# Patient Record
Sex: FEMALE | Race: WHITE | URBAN - METROPOLITAN AREA
[De-identification: names, ages, dates, MRNs, and addresses within clinical notes are randomized per-mention and may not be internally consistent; named-entity substitution may affect disease eponyms.]

---

## 2018-08-17 ENCOUNTER — APPOINTMENT (OUTPATIENT)
Age: 48
Setting detail: DERMATOLOGY
End: 2018-08-17

## 2018-08-17 VITALS — WEIGHT: 149.6 LBS | HEIGHT: 68 IN

## 2018-08-17 DIAGNOSIS — L40.0 PSORIASIS VULGARIS: ICD-10-CM

## 2018-08-17 PROCEDURE — OTHER COUNSELING: OTHER

## 2018-08-17 PROCEDURE — OTHER OTEZLA INITIATION: OTHER

## 2018-08-17 PROCEDURE — OTHER MIPS QUALITY: OTHER

## 2018-08-17 PROCEDURE — 99203 OFFICE O/P NEW LOW 30 MIN: CPT

## 2018-08-17 PROCEDURE — OTHER TREATMENT REGIMEN: OTHER

## 2018-08-17 PROCEDURE — OTHER FOLLOW UP FOR NEXT VISIT: OTHER

## 2018-08-17 ASSESSMENT — PGA PSORIASIS: PGA PSORIASIS: MODERATE (MODERATE PLAQUE ELEVATION, MODERATE ERYTHEMA, COARSE SCALE PREDOMINATES)

## 2018-08-17 ASSESSMENT — BSA PSORIASIS: % BODY COVERED IN PSORIASIS: 4

## 2018-08-17 NOTE — PROCEDURE: TREATMENT REGIMEN
Detail Level: Zone
Action 3: Continue
Plan: Patient has tried numerous topical, oral, and injectable medications. She did have some side effects from previous injectable medications. Patient was given an Otezla starter pack x 2  that she will start taking as directed. She will call if she has any problems once she starts the medication. Follow up in 6 to 8 weeks to check progress. We will try to get previous medical records from [facility] to review. If no improvement patient may need to follow up for a possible biopsy to confirm the diagnosis
Treatment 1: Otezla 30mg
Sig For Treatment 1 (If Needed): twice daily
Action 1: Start

## 2018-08-17 NOTE — PROCEDURE: COUNSELING
Quality 337: Tuberculosis Prevention For Psoriasis And Psoriatic Arthritis Patients On A Biological Immune Response Modifier: No documentation of negative or managed positive TB screen
Quality 410: Psoriasis Clinical Response To Oral Systemic Or Biologic Medications: Patient has been on biologic or system therapy for less than 6 months
Detail Level: Simple

## 2018-08-17 NOTE — HPI: RASH (PSORIASIS)
Do You Have A Family History Of Psoriasis?: no
How Severe Is Your Psoriasis?: moderate
Is This A New Presentation, Or A Follow-Up?: Psoriasis
Additional History: Patient here to establish care. She has been a previous patient at [facility]. She has tried numerous topical, oral, and injectable medications for her psoriasis. The last medication that she used, Taltz, did work but the patient had to stop because it was causing irritable bowel syndrome. She is here to establish care with me and is in need of treatment. Her primary area of concern are her right and left lower legs. No other skin surfaces affected. Patient is unaware if she ever had a biopsy to confirm her diagnosis. We will try to get her previous medical records from [facility]

## 2018-08-19 ENCOUNTER — RX ONLY (RX ONLY)
Age: 48
End: 2018-08-19

## 2019-01-03 ENCOUNTER — APPOINTMENT (OUTPATIENT)
Age: 49
Setting detail: DERMATOLOGY
End: 2019-01-04

## 2019-01-03 VITALS — HEIGHT: 68 IN

## 2019-01-03 DIAGNOSIS — L40.0 PSORIASIS VULGARIS: ICD-10-CM

## 2019-01-03 DIAGNOSIS — L57.8 OTHER SKIN CHANGES DUE TO CHRONIC EXPOSURE TO NONIONIZING RADIATION: ICD-10-CM

## 2019-01-03 PROCEDURE — 99213 OFFICE O/P EST LOW 20 MIN: CPT

## 2019-01-03 PROCEDURE — OTHER COUNSELING: OTHER

## 2019-01-03 PROCEDURE — OTHER MIPS QUALITY: OTHER

## 2019-01-03 PROCEDURE — OTHER OTEZLA MONITORING: OTHER

## 2019-01-03 PROCEDURE — OTHER PRESCRIPTION: OTHER

## 2019-01-03 PROCEDURE — OTHER TREATMENT REGIMEN: OTHER

## 2019-01-03 PROCEDURE — OTHER FOLLOW UP FOR NEXT VISIT: OTHER

## 2019-01-03 RX ORDER — TRETINOIN 0.5 MG/G
APPLY THIN COAT CREAM TOPICAL
Qty: 1 | Refills: 1 | Status: ERX | COMMUNITY
Start: 2019-01-03

## 2019-01-03 ASSESSMENT — LOCATION SIMPLE DESCRIPTION DERM
LOCATION SIMPLE: LEFT PRETIBIAL REGION
LOCATION SIMPLE: RIGHT PRETIBIAL REGION
LOCATION SIMPLE: LEFT CHEEK
LOCATION SIMPLE: RIGHT CHEEK

## 2019-01-03 ASSESSMENT — LOCATION DETAILED DESCRIPTION DERM
LOCATION DETAILED: RIGHT CENTRAL MALAR CHEEK
LOCATION DETAILED: RIGHT PROXIMAL PRETIBIAL REGION
LOCATION DETAILED: LEFT SUPERIOR MEDIAL BUCCAL CHEEK
LOCATION DETAILED: RIGHT SUPERIOR MEDIAL BUCCAL CHEEK
LOCATION DETAILED: LEFT MEDIAL MALAR CHEEK
LOCATION DETAILED: LEFT PROXIMAL PRETIBIAL REGION

## 2019-01-03 ASSESSMENT — BSA PSORIASIS: % BODY COVERED IN PSORIASIS: 0

## 2019-01-03 ASSESSMENT — LOCATION ZONE DERM
LOCATION ZONE: FACE
LOCATION ZONE: LEG

## 2019-01-03 ASSESSMENT — PGA PSORIASIS: PGA PSORIASIS: CLEAR (NO ELEVATION, SCALE, OR ERYTHEMA, EXCEPT FOR RESIDUAL DISCOLORATION)

## 2019-01-03 NOTE — PROCEDURE: COUNSELING
Detail Level: Simple
Quality 410: Psoriasis Clinical Response To Oral Systemic Or Biologic Medications: Psoriasis Assessment Tool Documented, Met Specified Benchmark
Detail Level: Detailed

## 2019-01-03 NOTE — PROCEDURE: OTEZLA MONITORING
Detail Level: Zone
Length Of Therapy: 4 months
Add High Risk Medication Management Associated Diagnosis?: No

## 2019-01-03 NOTE — PROCEDURE: TREATMENT REGIMEN
Initiate Treatment: Tretinoin 0.05% cream
Action 3: Continue
Sig For Treatment 1 (If Needed): twice daily
Treatment 1: Otezla 30mg
Detail Level: Zone
Plan: Patient is doing very well, The skin is completely clear, she will continue the Otezla 30 mg twice daily. She is receiving her prescription from [facility] and she will call us if a new prescription or refill is needed. Otherwise follow up in six months. No monitoring is specifically required on this medication
Plan: We will try to send in the generic prescription to her local pharmacy but if there is a cost issue she can call us back and we can send in the branded product to the mail order pharmacy. Follow up in six months. Patient is not trying to get pregnant and is aware of the possible photo sensitivity of the medication. She would benefit from a full body skin exam at her follow up visit and I will discuss this with her at follow up
Detail Level: Simple

## 2019-01-03 NOTE — PROCEDURE: FOLLOW UP FOR NEXT VISIT
Scheduled For Follow Up In (Optional): PRN
Detail Level: Generalized
Scheduled For Follow Up In (Optional): 6 months
Detail Level: Detailed

## 2019-09-27 ENCOUNTER — APPOINTMENT (OUTPATIENT)
Age: 49
Setting detail: DERMATOLOGY
End: 2019-09-28

## 2019-09-27 VITALS — HEIGHT: 66 IN

## 2019-09-27 DIAGNOSIS — L40.0 PSORIASIS VULGARIS: ICD-10-CM

## 2019-09-27 PROCEDURE — OTHER FOLLOW UP FOR NEXT VISIT: OTHER

## 2019-09-27 PROCEDURE — 99214 OFFICE O/P EST MOD 30 MIN: CPT

## 2019-09-27 PROCEDURE — OTHER COUNSELING: OTHER

## 2019-09-27 PROCEDURE — OTHER MIPS QUALITY: OTHER

## 2019-09-27 PROCEDURE — OTHER TREATMENT REGIMEN: OTHER

## 2019-09-27 PROCEDURE — OTHER OTEZLA MONITORING: OTHER

## 2019-09-27 ASSESSMENT — PGA PSORIASIS: PGA PSORIASIS: MODERATE (MODERATE PLAQUE ELEVATION, MODERATE ERYTHEMA, COARSE SCALE PREDOMINATES)

## 2019-09-27 ASSESSMENT — LOCATION ZONE DERM: LOCATION ZONE: LEG

## 2019-09-27 ASSESSMENT — LOCATION DETAILED DESCRIPTION DERM
LOCATION DETAILED: RIGHT DISTAL PRETIBIAL REGION
LOCATION DETAILED: LEFT PROXIMAL PRETIBIAL REGION
LOCATION DETAILED: RIGHT PROXIMAL PRETIBIAL REGION

## 2019-09-27 ASSESSMENT — LOCATION SIMPLE DESCRIPTION DERM
LOCATION SIMPLE: RIGHT PRETIBIAL REGION
LOCATION SIMPLE: LEFT PRETIBIAL REGION

## 2019-09-27 ASSESSMENT — BSA PSORIASIS: % BODY COVERED IN PSORIASIS: 4

## 2019-09-27 NOTE — PROCEDURE: COUNSELING
Quality 337: Tuberculosis Prevention For Psoriasis And Psoriatic Arthritis Patients On A Biological Immune Response Modifier: Patient has a documented negative annual TB screening.
Quality 410: Psoriasis Clinical Response To Oral Systemic Or Biologic Medications: Psoriasis Assessment Tool Documented, Not Meeting Specified Benchmark
Detail Level: Simple

## 2019-09-27 NOTE — PROCEDURE: TREATMENT REGIMEN
Samples Given: Lenorari x2 QHS, Hector Pamphlet
Action 2: Continue
Plan: For now patient will continue Otezla 30 mg twice a day. She will start using the topical medication and if that works she will call for a prescription. If not she will consider changing from Otezla to a new injectable medication. Otherwise follow up in three months
Initiate Regimen: Duobri daily
Detail Level: Simple
Sig For Treatment 1 (If Needed): twice daily
Treatment 1: Otezla 30mg

## 2019-09-27 NOTE — PROCEDURE: OTEZLA MONITORING
Detail Level: Zone
Add High Risk Medication Management Associated Diagnosis?: No
Length Of Therapy: 8 months

## 2020-07-01 ENCOUNTER — RX ONLY (RX ONLY)
Age: 50
End: 2020-07-01

## 2020-07-01 RX ORDER — CLOBETASOL PROPIONATE 0.5 MG/G
THIN COAT CREAM TOPICAL BID
Qty: 1 | Refills: 1 | Status: ERX | COMMUNITY
Start: 2020-07-01

## 2020-08-24 ENCOUNTER — RX ONLY (RX ONLY)
Age: 50
End: 2020-08-24

## 2020-08-24 RX ORDER — APREMILAST 30 MG/1
30MG TABLET, FILM COATED ORAL BID
Qty: 60 | Refills: 1 | Status: CANCELLED

## 2020-12-29 ENCOUNTER — APPOINTMENT (OUTPATIENT)
Age: 50
Setting detail: DERMATOLOGY
End: 2020-12-30

## 2020-12-29 DIAGNOSIS — L40.0 PSORIASIS VULGARIS: ICD-10-CM

## 2020-12-29 PROCEDURE — OTHER TREATMENT REGIMEN: OTHER

## 2020-12-29 PROCEDURE — 99213 OFFICE O/P EST LOW 20 MIN: CPT | Mod: 95

## 2020-12-29 PROCEDURE — OTHER COUNSELING: OTHER

## 2020-12-29 PROCEDURE — OTHER OTEZLA MONITORING: OTHER

## 2020-12-29 PROCEDURE — OTHER FOLLOW UP FOR NEXT VISIT: OTHER

## 2020-12-29 ASSESSMENT — LOCATION DETAILED DESCRIPTION DERM
LOCATION DETAILED: LEFT PROXIMAL PRETIBIAL REGION
LOCATION DETAILED: RIGHT PROXIMAL PRETIBIAL REGION
LOCATION DETAILED: RIGHT DISTAL PRETIBIAL REGION

## 2020-12-29 ASSESSMENT — LOCATION SIMPLE DESCRIPTION DERM
LOCATION SIMPLE: LEFT PRETIBIAL REGION
LOCATION SIMPLE: RIGHT PRETIBIAL REGION

## 2020-12-29 ASSESSMENT — LOCATION ZONE DERM: LOCATION ZONE: LEG

## 2020-12-29 ASSESSMENT — PGA PSORIASIS: PGA PSORIASIS 2020: ALMOST CLEAR

## 2020-12-29 ASSESSMENT — BSA PSORIASIS: % BODY COVERED IN PSORIASIS: 2

## 2020-12-29 NOTE — PROCEDURE: COUNSELING
Detail Level: Zone
Quality 410: Psoriasis Clinical Response To Oral Systemic Or Biologic Medications: Psoriasis Assessment Tool Documented, Met Specified Benchmark

## 2020-12-29 NOTE — PROCEDURE: TREATMENT REGIMEN
Detail Level: Zone
Action 4: Continue
Plan: Overall patient is doing well. She will continue Otezla 30 mg twice a day and use a topical steroid as needed. She will follow up annually. It would be good to see her in person at some point for a comprehensive skin exam
Treatment 1: Otezla 30mg
Treatment 2: Clobetasol cream
Continue Regimen: Otezla, Clobetasol prn
Sig For Treatment 2 (If Needed): once daily
Sig For Treatment 1 (If Needed): twice daily

## 2020-12-29 NOTE — PROCEDURE: OTEZLA MONITORING
Add High Risk Medication Management Associated Diagnosis?: No
Length Of Therapy: 8 months
Detail Level: Zone

## 2021-01-27 ENCOUNTER — RX ONLY (RX ONLY)
Age: 51
End: 2021-01-27

## 2021-01-27 RX ORDER — APREMILAST 30 MG/1
30MG TABLET, FILM COATED ORAL BID
Qty: 60 | Refills: 11 | Status: ERX

## 2021-02-25 ENCOUNTER — APPOINTMENT (OUTPATIENT)
Age: 51
Setting detail: DERMATOLOGY
End: 2021-02-26

## 2021-02-25 VITALS — WEIGHT: 142 LBS | HEIGHT: 68 IN | TEMPERATURE: 97.3 F

## 2021-02-25 DIAGNOSIS — L57.8 OTHER SKIN CHANGES DUE TO CHRONIC EXPOSURE TO NONIONIZING RADIATION: ICD-10-CM

## 2021-02-25 DIAGNOSIS — L72.0 EPIDERMAL CYST: ICD-10-CM

## 2021-02-25 PROCEDURE — OTHER SUNSCREEN RECOMMENDATIONS: OTHER

## 2021-02-25 PROCEDURE — 17110 DESTRUCT B9 LESION 1-14: CPT

## 2021-02-25 PROCEDURE — OTHER PRESCRIPTION: OTHER

## 2021-02-25 PROCEDURE — OTHER TREATMENT REGIMEN: OTHER

## 2021-02-25 PROCEDURE — OTHER BENIGN DESTRUCTION: OTHER

## 2021-02-25 PROCEDURE — OTHER COUNSELING: OTHER

## 2021-02-25 PROCEDURE — OTHER MIPS QUALITY: OTHER

## 2021-02-25 RX ORDER — TRETIONIN 1 MG/G
THIN COAT CREAM TOPICAL QHS
Qty: 1 | Refills: 2 | Status: ERX | COMMUNITY
Start: 2021-02-25

## 2021-02-25 ASSESSMENT — LOCATION DETAILED DESCRIPTION DERM
LOCATION DETAILED: RIGHT LOWER CUTANEOUS LIP
LOCATION DETAILED: LEFT LATERAL CANTHUS
LOCATION DETAILED: RIGHT MEDIAL MALAR CHEEK

## 2021-02-25 ASSESSMENT — LOCATION SIMPLE DESCRIPTION DERM
LOCATION SIMPLE: RIGHT LIP
LOCATION SIMPLE: LEFT EYELID
LOCATION SIMPLE: RIGHT CHEEK

## 2021-02-25 ASSESSMENT — LOCATION ZONE DERM
LOCATION ZONE: FACE
LOCATION ZONE: LIP
LOCATION ZONE: EYELID

## 2021-02-25 NOTE — PROCEDURE: BENIGN DESTRUCTION
Bill Insurance (You Assume Risk Of Denial Or Audit By Selecting Yes): Yes
Add 52 Modifier (Optional): no
Medical Necessity Clause: This procedure was medically necessary because the lesions that were treated were: irritated and enlarging
Detail Level: Detailed
Post-Care Instructions: I reviewed with the patient in detail post-care instructions. Patient is to wear sunprotection, and avoid picking at any of the treated lesions. Pt may apply Vaseline to crusted or scabbing areas.
Consent: The patient's consent was obtained including but not limited to risks of crusting, scabbing, blistering, scarring, darker or lighter pigmentary change, recurrence, incomplete removal and infection.
Anesthesia Volume In Cc: 0.4
Medical Necessity Information: It is in your best interest to select a reason for this procedure from the list below. All of these items fulfill various CMS LCD requirements except the new and changing color options.

## 2021-02-25 NOTE — HPI: CYST (MILIA)
How Severe Is It?: mild
Is This A New Presentation, Or A Follow-Up?: Cysts
Additional History: Pt would like to discuss milia extraction as well as getting a prescription of Tretinoin. Patient essentially has a few raised spots on her face that she would like checked and treated today. None of them are very bothersome but they are occasionally irritated

## 2021-02-25 NOTE — PROCEDURE: TREATMENT REGIMEN
Plan: 1 small milia at the left lateral eye removed with # 11 blade and comedone extractor. Then she had two small benign fibrous papules that we treated with electrodesiccation and light curettage. Follow up as needed
Initiate Treatment: Tretinoin 0.1% cream
Initiate Treatment: Benign destruction
Detail Level: Zone
Detail Level: Detailed

## 2022-01-13 ENCOUNTER — APPOINTMENT (OUTPATIENT)
Age: 52
Setting detail: DERMATOLOGY
End: 2022-01-16

## 2022-01-13 DIAGNOSIS — L40.0 PSORIASIS VULGARIS: ICD-10-CM

## 2022-01-13 PROCEDURE — OTHER PRESCRIPTION: OTHER

## 2022-01-13 PROCEDURE — OTHER OTEZLA MONITORING: OTHER

## 2022-01-13 PROCEDURE — OTHER TREATMENT REGIMEN: OTHER

## 2022-01-13 PROCEDURE — OTHER FOLLOW UP FOR NEXT VISIT: OTHER

## 2022-01-13 PROCEDURE — OTHER COUNSELING: OTHER

## 2022-01-13 PROCEDURE — 99214 OFFICE O/P EST MOD 30 MIN: CPT

## 2022-01-13 PROCEDURE — OTHER ORDER TESTS: OTHER

## 2022-01-13 RX ORDER — CLOBETASOL PROPIONATE 0.5 MG/G
THIN COAT CREAM TOPICAL BID
Qty: 45 | Refills: 1 | Status: ERX | COMMUNITY
Start: 2022-01-13

## 2022-01-13 ASSESSMENT — PGA PSORIASIS: PGA PSORIASIS 2020: MODERATE

## 2022-01-13 ASSESSMENT — BSA PSORIASIS: % BODY COVERED IN PSORIASIS: 10

## 2022-01-13 ASSESSMENT — LOCATION SIMPLE DESCRIPTION DERM
LOCATION SIMPLE: LEFT PRETIBIAL REGION
LOCATION SIMPLE: RIGHT PRETIBIAL REGION

## 2022-01-13 ASSESSMENT — LOCATION DETAILED DESCRIPTION DERM
LOCATION DETAILED: RIGHT PROXIMAL PRETIBIAL REGION
LOCATION DETAILED: RIGHT DISTAL PRETIBIAL REGION
LOCATION DETAILED: LEFT PROXIMAL PRETIBIAL REGION

## 2022-01-13 ASSESSMENT — LOCATION ZONE DERM: LOCATION ZONE: LEG

## 2022-01-13 NOTE — PROCEDURE: COUNSELING
Quality 337: Tuberculosis Prevention For Psoriasis And Psoriatic Arthritis Patients On A Biological Immune Response Modifier: No documentation of negative or managed positive TB screen
Quality 410: Psoriasis Clinical Response To Oral Systemic Or Biologic Medications: Psoriasis Assessment Tool Documented, Not Meeting Specified Benchmark
Detail Level: Zone

## 2022-01-13 NOTE — PROCEDURE: TREATMENT REGIMEN
Treatment 2: Clobetasol cream
Action 2: Continue
Continue Regimen: Otezla, Clobetasol prn
Detail Level: Zone
Treatment 1: Otezla 30mg
Sig For Treatment 2 (If Needed): once daily
Plan: For now patient will continue Otezla 30 mg twice a day. We will submit paperwork for authorization for Skyrizi. Due to the fact the patient is having ongoing exacerbation even on Otezla it’s time to change the medication. She will follow up two months after starting Skyrizi. She will check her blood work prior to submitting the authorization for Skyrizi
Sig For Treatment 1 (If Needed): twice daily
Discontinue Regimen: Otezla once starting Skyrizi

## 2022-01-17 ENCOUNTER — RX ONLY (RX ONLY)
Age: 52
End: 2022-01-17

## 2022-01-17 RX ORDER — APREMILAST 30 MG/1
TABLET, FILM COATED ORAL BID
Qty: 60 | Refills: 11 | Status: CANCELLED

## 2022-03-02 ENCOUNTER — RX ONLY (RX ONLY)
Age: 52
End: 2022-03-02

## 2022-03-02 RX ORDER — BETAMETHASONE DIPROPIONATE 0.5 MG/G
THIN COAT CREAM TOPICAL BID
Qty: 45 | Refills: 1 | Status: ERX | COMMUNITY
Start: 2022-03-02

## 2022-03-16 ENCOUNTER — RX ONLY (RX ONLY)
Age: 52
End: 2022-03-16

## 2022-03-16 RX ORDER — PREDNISONE 10 MG/1
10MG TABLET ORAL QAM
Qty: 100 | Refills: 0 | Status: CANCELLED
Stop reason: CLARIF

## 2022-03-16 RX ORDER — PREDNISONE 10 MG/1
10MG TABLET ORAL QAM
Qty: 38 | Refills: 0 | Status: ERX | COMMUNITY
Start: 2022-03-16

## 2022-03-30 ENCOUNTER — RX ONLY (RX ONLY)
Age: 52
End: 2022-03-30

## 2022-03-30 RX ORDER — RISANKIZUMAB-RZAA 150 MG/ML
150MG INJECTION SUBCUTANEOUS
Qty: 1 | Refills: 6 | Status: ERX | COMMUNITY
Start: 2022-03-30

## 2023-04-05 ENCOUNTER — APPOINTMENT (OUTPATIENT)
Age: 53
Setting detail: DERMATOLOGY
End: 2023-04-05

## 2023-04-05 DIAGNOSIS — L40.0 PSORIASIS VULGARIS: ICD-10-CM

## 2023-04-05 PROCEDURE — OTHER TREATMENT REGIMEN: OTHER

## 2023-04-05 PROCEDURE — OTHER FOLLOW UP FOR NEXT VISIT: OTHER

## 2023-04-05 PROCEDURE — OTHER ORDER TESTS: OTHER

## 2023-04-05 PROCEDURE — OTHER COUNSELING: OTHER

## 2023-04-05 ASSESSMENT — LOCATION DETAILED DESCRIPTION DERM
LOCATION DETAILED: RIGHT DISTAL PRETIBIAL REGION
LOCATION DETAILED: RIGHT PROXIMAL PRETIBIAL REGION
LOCATION DETAILED: LEFT PROXIMAL PRETIBIAL REGION

## 2023-04-05 ASSESSMENT — LOCATION ZONE DERM: LOCATION ZONE: LEG

## 2023-04-05 ASSESSMENT — LOCATION SIMPLE DESCRIPTION DERM
LOCATION SIMPLE: LEFT PRETIBIAL REGION
LOCATION SIMPLE: RIGHT PRETIBIAL REGION

## 2023-04-05 NOTE — PROCEDURE: COUNSELING
Quality 410: Psoriasis Clinical Response To Oral Systemic Or Biologic Medications: Psoriasis Assessment Tool Documented, Not Meeting Specified Benchmark
Quality 337: Tuberculosis Prevention For Psoriasis And Psoriatic Arthritis Patients On A Biological Immune Response Modifier: No documentation of negative or managed positive TB screen
Detail Level: Zone

## 2023-04-05 NOTE — PROCEDURE: TREATMENT REGIMEN
Action 4: Continue
Treatment 2: Clobetasol cream
Continue Regimen: Otezla, Clobetasol prn
Treatment 1: Otezla 30mg
Detail Level: Zone
Sig For Treatment 2 (If Needed): once daily
Plan: For now patient will continue Otezla 30 mg twice a day. We will submit paperwork for authorization for Skyrizi. Due to the fact the patient is having ongoing exacerbation even on Otezla it’s time to change the medication. She will follow up two months after starting Skyrizi. She will check her blood work prior to submitting the authorization for Skyrizi
Sig For Treatment 1 (If Needed): twice daily
Discontinue Regimen: Otezla once starting Skyrizi

## 2023-04-06 ENCOUNTER — APPOINTMENT (OUTPATIENT)
Age: 53
Setting detail: DERMATOLOGY
End: 2023-04-06

## 2023-04-06 DIAGNOSIS — L40.0 PSORIASIS VULGARIS: ICD-10-CM

## 2023-04-06 PROCEDURE — OTHER COUNSELING: OTHER

## 2023-04-06 PROCEDURE — OTHER ORDER TESTS: OTHER

## 2023-04-06 PROCEDURE — OTHER TREATMENT REGIMEN: OTHER

## 2023-04-06 PROCEDURE — OTHER OTEZLA MONITORING: OTHER

## 2023-04-06 PROCEDURE — OTHER FOLLOW UP FOR NEXT VISIT: OTHER

## 2023-04-06 ASSESSMENT — LOCATION SIMPLE DESCRIPTION DERM
LOCATION SIMPLE: RIGHT PRETIBIAL REGION
LOCATION SIMPLE: LEFT PRETIBIAL REGION

## 2023-04-06 ASSESSMENT — LOCATION ZONE DERM: LOCATION ZONE: LEG

## 2023-04-06 NOTE — PROCEDURE: TREATMENT REGIMEN
Plan: For now patient will continue Otezla 30 mg twice a day. We will submit paperwork for authorization for Skyrizi. Due to the fact the patient is having ongoing exacerbation even on Otezla it’s time to change the medication. She will follow up two months after starting Skyrizi. She will check her blood work prior to submitting the authorization for Skyrizi
Detail Level: Zone
Action 4: Continue
Treatment 1: Otezla 30mg
Continue Regimen: Otezla, Clobetasol prn
Sig For Treatment 1 (If Needed): twice daily
Sig For Treatment 2 (If Needed): once daily
Treatment 2: Clobetasol cream
Discontinue Regimen: Otezla once starting Skyrizi

## 2023-04-06 NOTE — PROCEDURE: COUNSELING
Detail Level: Zone
Quality 337: Tuberculosis Prevention For Psoriasis And Psoriatic Arthritis Patients On A Biological Immune Response Modifier: No documentation of negative or managed positive TB screen
Quality 410: Psoriasis Clinical Response To Oral Systemic Or Biologic Medications: Psoriasis Assessment Tool Documented, Not Meeting Specified Benchmark

## 2023-04-11 ENCOUNTER — APPOINTMENT (OUTPATIENT)
Age: 53
Setting detail: DERMATOLOGY
End: 2023-04-11

## 2023-04-11 DIAGNOSIS — L40.0 PSORIASIS VULGARIS: ICD-10-CM

## 2023-04-11 PROCEDURE — 99213 OFFICE O/P EST LOW 20 MIN: CPT

## 2023-04-11 PROCEDURE — OTHER ORDER TESTS: OTHER

## 2023-04-11 PROCEDURE — OTHER FOLLOW UP FOR NEXT VISIT: OTHER

## 2023-04-11 PROCEDURE — OTHER TREATMENT REGIMEN: OTHER

## 2023-04-11 PROCEDURE — OTHER COUNSELING: OTHER

## 2023-04-11 ASSESSMENT — LOCATION SIMPLE DESCRIPTION DERM
LOCATION SIMPLE: RIGHT PRETIBIAL REGION
LOCATION SIMPLE: LEFT PRETIBIAL REGION

## 2023-04-11 ASSESSMENT — ITCH NUMERIC RATING SCALE: HOW SEVERE IS YOUR ITCHING?: 0

## 2023-04-11 ASSESSMENT — PGA PSORIASIS: PGA PSORIASIS 2020: CLEAR

## 2023-04-11 ASSESSMENT — BSA PSORIASIS: % BODY COVERED IN PSORIASIS: 0

## 2023-04-11 ASSESSMENT — LOCATION ZONE DERM: LOCATION ZONE: LEG

## 2023-04-11 NOTE — PROCEDURE: TREATMENT REGIMEN
Detail Level: Zone
Sig For Treatment 2 (If Needed): once daily
Treatment 2: Clobetasol cream
Treatment 1: Skyrizi 150 mg injected every 12 weeks
Sig For Treatment 1 (If Needed): twice daily
Action 2: Continue
Plan: Patient has seen complete clearance on Skyrizi. She has a reduced itch rating scale of 0. She will continue injecting every 12 weeks. We will reauthorize the medication with her insurance. Lab slip given to patient to have annual TB test. Follow up if anything changes, otherwise annually.
Samples Given: Lab slip
Continue Regimen: Skyrizi every 12 weeks

## 2023-04-18 ENCOUNTER — RX ONLY (RX ONLY)
Age: 53
End: 2023-04-18

## 2023-04-18 RX ORDER — RISANKIZUMAB-RZAA 150 MG/ML
150 MG INJECTION SUBCUTANEOUS
Qty: 1 | Refills: 5 | Status: ERX

## 2024-06-04 ENCOUNTER — APPOINTMENT (OUTPATIENT)
Age: 54
Setting detail: DERMATOLOGY
End: 2024-06-04

## 2024-06-04 DIAGNOSIS — L40.0 PSORIASIS VULGARIS: ICD-10-CM

## 2024-06-04 PROCEDURE — OTHER FOLLOW UP FOR NEXT VISIT: OTHER

## 2024-06-04 PROCEDURE — OTHER TREATMENT REGIMEN: OTHER

## 2024-06-04 PROCEDURE — OTHER COUNSELING: OTHER

## 2024-06-04 ASSESSMENT — LOCATION SIMPLE DESCRIPTION DERM
LOCATION SIMPLE: RIGHT PRETIBIAL REGION
LOCATION SIMPLE: LEFT PRETIBIAL REGION

## 2024-06-04 ASSESSMENT — LOCATION ZONE DERM: LOCATION ZONE: LEG

## 2024-06-04 NOTE — PROCEDURE: TREATMENT REGIMEN
Treatment 1: Skyrizi 150 mg injected every 12 weeks
Detail Level: Zone
Sig For Treatment 1 (If Needed): twice daily
Action 4: Continue
Plan: Patient has seen complete clearance on Skyrizi. She has a reduced itch rating scale of 0. She will continue injecting every 12 weeks. We will reauthorize the medication with her insurance. Lab slip given to patient to have annual TB test. Follow up if anything changes, otherwise annually.
Sig For Treatment 2 (If Needed): once daily
Samples Given: Lab slip
Treatment 2: Clobetasol cream
Continue Regimen: Skyrizi every 12 weeks

## 2024-09-30 ENCOUNTER — APPOINTMENT (OUTPATIENT)
Age: 54
Setting detail: DERMATOLOGY
End: 2024-10-01

## 2024-09-30 DIAGNOSIS — L40.0 PSORIASIS VULGARIS: ICD-10-CM

## 2024-09-30 PROCEDURE — OTHER TREATMENT REGIMEN: OTHER

## 2024-09-30 PROCEDURE — OTHER COUNSELING: OTHER

## 2024-09-30 PROCEDURE — 99213 OFFICE O/P EST LOW 20 MIN: CPT

## 2024-09-30 PROCEDURE — OTHER FOLLOW UP FOR NEXT VISIT: OTHER

## 2024-09-30 PROCEDURE — OTHER SOTYKTU INITIATION: OTHER

## 2024-09-30 ASSESSMENT — LOCATION SIMPLE DESCRIPTION DERM
LOCATION SIMPLE: RIGHT PRETIBIAL REGION
LOCATION SIMPLE: RIGHT CALF
LOCATION SIMPLE: LEFT PRETIBIAL REGION
LOCATION SIMPLE: LEFT CALF

## 2024-09-30 ASSESSMENT — LOCATION DETAILED DESCRIPTION DERM
LOCATION DETAILED: LEFT DISTAL CALF
LOCATION DETAILED: RIGHT DISTAL PRETIBIAL REGION
LOCATION DETAILED: LEFT PROXIMAL PRETIBIAL REGION
LOCATION DETAILED: RIGHT DISTAL CALF

## 2024-09-30 ASSESSMENT — ITCH NUMERIC RATING SCALE: HOW SEVERE IS YOUR ITCHING?: 4

## 2024-09-30 ASSESSMENT — BSA PSORIASIS: % BODY COVERED IN PSORIASIS: 10

## 2024-09-30 ASSESSMENT — PGA PSORIASIS: PGA PSORIASIS 2020: MODERATE

## 2024-09-30 ASSESSMENT — LOCATION ZONE DERM: LOCATION ZONE: LEG

## 2024-09-30 NOTE — PROCEDURE: TREATMENT REGIMEN
Detail Level: Zone
Discontinue Regimen: Skyrizi
Action 2: Continue
Samples Given: Sotyktu x 60 days
Initiate Regimen: Sotyktu
Plan: Patient has been off Skyrizi since July. She did have majority clearance with regular flare ups however due to insurance she had to stop taking it. She was interested in trying Sotyktu as a replacement. She did have blood work in the last year that she will have sent to our office. Gave patient 60 days of Sotyktu samples to try. She will follow up if she would like to continue the medication or if she does not see improvement. Patient filled out enrollment form in office today.

## 2024-09-30 NOTE — PROCEDURE: SOTYKTU INITIATION
Is Soriatane Contraindicated?: No
Pregnancy And Lactation Warning Text: There is insufficient data to evaluate whether or not Sotyktu is safe to use during pregnancy.   It is not known if Sotyktu passes into breast milk and whether or not it is safe to use when breastfeeding.  
Sotyktu Dosing: 6mg Daily
Diagnosis (Required): Psoriasis
Sotyktu Monitoring Guidelines: LFTs, lipids, hepatitis screening, TB screening and pregnancy tests should be checked prior to initiating Sotyktu therapy.  Labs may also be checked periodically after the initiation period.
Detail Level: Zone

## 2024-12-02 ENCOUNTER — APPOINTMENT (OUTPATIENT)
Age: 54
Setting detail: DERMATOLOGY
End: 2024-12-03

## 2024-12-02 DIAGNOSIS — L40.0 PSORIASIS VULGARIS: ICD-10-CM

## 2024-12-02 PROCEDURE — OTHER TREATMENT REGIMEN: OTHER

## 2024-12-02 PROCEDURE — OTHER SOTYKTU MONITORING: OTHER

## 2024-12-02 PROCEDURE — 99213 OFFICE O/P EST LOW 20 MIN: CPT

## 2024-12-02 PROCEDURE — OTHER COUNSELING: OTHER

## 2024-12-02 PROCEDURE — OTHER FOLLOW UP FOR NEXT VISIT: OTHER

## 2024-12-02 ASSESSMENT — LOCATION SIMPLE DESCRIPTION DERM
LOCATION SIMPLE: LEFT PRETIBIAL REGION
LOCATION SIMPLE: LEFT CALF
LOCATION SIMPLE: RIGHT PRETIBIAL REGION
LOCATION SIMPLE: RIGHT CALF

## 2024-12-02 ASSESSMENT — ITCH NUMERIC RATING SCALE: HOW SEVERE IS YOUR ITCHING?: 0

## 2024-12-02 ASSESSMENT — LOCATION DETAILED DESCRIPTION DERM
LOCATION DETAILED: LEFT DISTAL CALF
LOCATION DETAILED: LEFT PROXIMAL PRETIBIAL REGION
LOCATION DETAILED: RIGHT DISTAL CALF
LOCATION DETAILED: RIGHT DISTAL PRETIBIAL REGION

## 2024-12-02 ASSESSMENT — BSA PSORIASIS: % BODY COVERED IN PSORIASIS: 3

## 2024-12-02 ASSESSMENT — LOCATION ZONE DERM: LOCATION ZONE: LEG

## 2024-12-02 ASSESSMENT — PGA PSORIASIS: PGA PSORIASIS 2020: ALMOST CLEAR

## 2024-12-02 NOTE — PROCEDURE: TREATMENT REGIMEN
Detail Level: Zone
Continue Regimen: Sotyktu
Action 2: Continue
Samples Given: VTAMA X 3
Plan: Patient will continue the Sotyktu. One more month of samples were given. We will send in her paperwork to get this approved through her insurance. Follow up in three months to check progress. She will start the sample samples of the VTAMA once daily and if this is working for the residual psoriasis, she will call for a prescription.

## 2024-12-02 NOTE — PROCEDURE: SOTYKTU MONITORING
Current Dosage: 6mg Daily
Detail Level: Zone
Length Of Therapy (Optional): 2 months
Add High Risk Medication Management Associated Diagnosis?: No

## 2024-12-05 ENCOUNTER — RX ONLY (RX ONLY)
Age: 54
End: 2024-12-05

## 2024-12-05 RX ORDER — DEUCRAVACITINIB 6 MG/1
6MG TABLET, FILM COATED ORAL DAILY
Qty: 90 | Refills: 3 | Status: ERX | COMMUNITY
Start: 2024-12-05

## 2024-12-12 ENCOUNTER — APPOINTMENT (OUTPATIENT)
Age: 54
Setting detail: DERMATOLOGY
End: 2024-12-27

## 2024-12-12 DIAGNOSIS — L40.0 PSORIASIS VULGARIS: ICD-10-CM

## 2024-12-12 PROCEDURE — OTHER TREATMENT REGIMEN: OTHER

## 2024-12-12 PROCEDURE — OTHER ORDER TESTS: OTHER

## 2024-12-12 NOTE — PROCEDURE: ORDER TESTS
Bill For Surgical Tray: no
Billing Type: Third-Party Bill
Performing Laboratory: [id number]
Expected Date Of Service: 12/12/2024

## 2025-01-06 ENCOUNTER — APPOINTMENT (OUTPATIENT)
Age: 55
Setting detail: DERMATOLOGY
End: 2025-01-06

## 2025-01-06 DIAGNOSIS — L40.0 PSORIASIS VULGARIS: ICD-10-CM

## 2025-01-06 PROCEDURE — OTHER ORDER TESTS: OTHER

## 2025-01-06 PROCEDURE — OTHER TREATMENT REGIMEN: OTHER

## 2025-01-06 NOTE — PROCEDURE: ORDER TESTS
Bill For Surgical Tray: no
Billing Type: Third-Party Bill
Performing Laboratory: [id number]
Expected Date Of Service: 12/12/2024